# Patient Record
Sex: MALE
[De-identification: names, ages, dates, MRNs, and addresses within clinical notes are randomized per-mention and may not be internally consistent; named-entity substitution may affect disease eponyms.]

---

## 2019-03-20 ENCOUNTER — HOSPITAL ENCOUNTER (EMERGENCY)
Dept: HOSPITAL 31 - C.ER | Age: 29
Discharge: HOME | End: 2019-03-20
Payer: SELF-PAY

## 2019-03-20 VITALS
RESPIRATION RATE: 14 BRPM | SYSTOLIC BLOOD PRESSURE: 120 MMHG | HEART RATE: 80 BPM | DIASTOLIC BLOOD PRESSURE: 70 MMHG | TEMPERATURE: 98.5 F

## 2019-03-20 VITALS — OXYGEN SATURATION: 100 %

## 2019-03-20 DIAGNOSIS — M54.5: Primary | ICD-10-CM

## 2019-03-20 NOTE — C.PDOC
History Of Present Illness


29 y/o male presents to the ER complaining of  pain to low back. Patient states 

that the pain began after he bent down to  an object. Patient reports 

that he has history of several episodes of back pain and he has taken anti-

inflammatory medications. Denies having fever,chills,cough, CP,SOB, dysuria, 

hematuria, and weakness.


Time Seen by Provider: 03/20/19 19:15


Chief Complaint (Nursing): Back Pain


History Per: Patient


History/Exam Limitations: no limitations


Onset/Duration Of Symptoms: Days


Current Symptoms Are (Timing): Still Present


Severity: Moderate





Past Medical History


Reviewed: Historical Data, Nursing Documentation, Vital Signs


Vital Signs: 





                                Last Vital Signs











Temp  98.6 F   03/20/19 18:40


 


Pulse  63   03/20/19 18:40


 


Resp  17   03/20/19 18:40


 


BP  126/76   03/20/19 18:40


 


Pulse Ox  100   03/20/19 18:40














- Medical History


PMH: No Chronic Diseases


Surgical History: No Surg Hx


Family History: States: No Known Family Hx





- Social History


Hx Alcohol Use: No


Hx Substance Use: No





- Immunization History


Hx Tetanus Toxoid Vaccination: Yes


Hx Influenza Vaccination: No


Hx Pneumococcal Vaccination: No





Review Of Systems


Genitourinary: Negative for: Dysuria, Frequency, Incontinence, Hematuria


Musculoskeletal: Positive for: Back Pain





Physical Exam





- Physical Exam


Appears: Non-toxic, No Acute Distress


Skin: Normal Color, Warm, Dry


Head: Atraumatic, Normacephalic


Eye(s): bilateral: Normal Inspection


Neck: Supple


Chest: Symmetrical


Cardiovascular: Rhythm Regular


Respiratory: Normal Breath Sounds, No Rales, No Rhonchi, No Wheezing


Gastrointestinal/Abdominal: Normal Exam, Soft, No Tenderness, No Guarding, No 

Rebound


Back: Vertebral Tenderness (mild vertebral tenderness), Other (mild lumbar 

tenderness)


Neurological/Psych: Oriented x3, Normal Speech, Normal Motor, Normal Sensation


Gait: Steady





ED Course And Treatment


O2 Sat by Pulse Oximetry: 100 (RA)


Pulse Ox Interpretation: Normal





Medical Decision Making


Medical Decision Making: 


Plan:


--Tramadol PO


--Motrin PO





Disposition


Counseled Patient/Family Regarding: Diagnosis, Need For Followup, Rx Given





- Disposition


Referrals: 


[facility] [Outside]


Disposition: HOME/ ROUTINE


Disposition Time: 20:35


Condition: STABLE


Additional Instructions: 


Use mike sanderson de soporte posterior cuando renay cualquier trabajo pesado.


Prescriptions: 


Ibuprofen [Motrin Tab] 800 mg PO TID PRN #21 tab


 PRN Reason: Pain, Moderate (4-7)


traMADol [Ultram] 50 mg PO TID PRN #15 tab


 PRN Reason: Pain, Severe (8-10)


Instructions:  Low Back Pain  (DC)


Forms:  Gen Discharge Inst Slovenian, [facility] (Slovenian)


Print Language: Amharic





- Clinical Impression


Clinical Impression: 


 Low back pain








- PA / NP / Resident Statement


MD/DO has reviewed & agrees with the documentation as recorded.





- Scribe Statement


The provider has reviewed the documentation as recorded by the Fartunibe


Jeferson Wharton





Provider Attestation





All medical record entries made by the Fartunibe were at my direction and 

personally dictated by me. I have reviewed the chart and agree that the record 

accurately reflects my personal performance of the history, physical exam, 

medical decision making, and the department course for this patient. I have also

 personally directed, reviewed, and agree with the discharge instructions and 

disposition.